# Patient Record
Sex: FEMALE
[De-identification: names, ages, dates, MRNs, and addresses within clinical notes are randomized per-mention and may not be internally consistent; named-entity substitution may affect disease eponyms.]

---

## 2018-07-20 NOTE — RADIOLOGY IMAGING REPORT
[place] [facility] [place] 

 

PATIENT NAME: Kerri Burch

: 1950

MR: [id number]

V: [id number]

EXAM DATE: 

ORDERING PHYSICIAN: DEANNE CERNA

TECHNOLOGIST: 

 

Location: [facility]

Patient: Kerri Burch

: 1950

MRN: [id number]

Visit/Account:[id number]

Date of Sevice:  2018

 

ACCESSION #: [id number]

 

DEXA Scan

 

Clinical history:  Osteopenia.

 

Comparison: None available.

 

LUMBAR SPINE:

The bone mineral density (BMD) measured from L1-L4 correlates with a Z-score 0.8 and a T-score of -1.
1 which is osteopenia as defined by the World Health Organization.  The corresponding risk of fractur
e in the lumbar spine is 2-3 times compared with a young adult reference population.

 

HIP:

Bone mineral density (BMD) measured in the Left femoral neck region correlates with a Z-score -0.1 an
d a T-score of -1.8 which is osteopenia as defined by the World Health Organization.  The correspondi
ng risk of fracture in the hip is 3-4 times compared with a young adult reference population.

 

Bone mineral density (BMD) measured in the Femoral Neck region measures 0.782 g/cm2.

 

Impression:

1.  Lumbar spine:  Osteopenia.

2.  Left Hip:  Osteopenia.

3.  Femoral Neck:  Bone Mineral Density is 0.782 g/cm2

 

The next DEXA scan of this patient should include the following sites: L1-L4 and the left hip.

 

FRAX? WHO Fracture Risk Assessment Tool link:

<http://www.shef.ac.uk/FRAX/tool.jsp?locationValue=9>

 

PLEASE NOTE:

1)  The World Health Organization defines low BMD as follows:

                                                                       T-score

                   Normal                                  > -1

                   Osteopenia                           < -1 and  > -2.5

                   Osteoporosis                         < -2.5 without fractures

                   Established osteoporosis       < -2.5 with fractures

2)  In general, you may wish to consider:

                    Diagnosis                  Treatment                       Follow-up DEXA

 

                    Normal BMD            Prevention                      2-3 years

                    Osteopenia                Prevention/therapy         1-2 years

                    Osteoporosis             Therapy                           Yearly

3)  Fracture risk estimated from the T-score is more accurate for vertebral fractures (often spontane
ous) than for hip fractures.

 

 

Report Dictated By: Laron Roger MD at 2018 12:19 PM

 

Report E-Signed By: Laron Roger MD  at 2018 12:20 PM

 

WSN:[id number]

## 2018-10-17 ENCOUNTER — HOSPITAL ENCOUNTER (EMERGENCY)
Dept: HOSPITAL 89 - ER | Age: 68
Discharge: HOME | End: 2018-10-17
Payer: MEDICARE

## 2018-10-17 VITALS — DIASTOLIC BLOOD PRESSURE: 83 MMHG | SYSTOLIC BLOOD PRESSURE: 116 MMHG
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
## 2018-10-17 DIAGNOSIS — S06.0X0A: Primary | ICD-10-CM

## 2018-10-17 PROCEDURE — 70486 CT MAXILLOFACIAL W/O DYE: CPT

## 2018-10-17 PROCEDURE — 70450 CT HEAD/BRAIN W/O DYE: CPT

## 2018-10-17 PROCEDURE — 99284 EMERGENCY DEPT VISIT MOD MDM: CPT

## 2018-10-17 PROCEDURE — 72125 CT NECK SPINE W/O DYE: CPT
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
## 2018-10-17 NOTE — RADIOLOGY IMAGING REPORT
FACILITY: [facility] 

 

PATIENT NAME: Kerri Burch

: 1950

MR: [id number]

V: [id number]

EXAM DATE: [id number]

ORDERING PHYSICIAN: ANIA BATES

TECHNOLOGIST: 

 

Location: [facility]

Patient: Kerri Burch

: 1950

MRN: [id number]

Visit/Account:[id number]

Date of Sevice: 10/17/2018

 

ACCESSION #: [id number]

 

Head CT scan without contrast

 

COMPARISONS: None

 

ADDITIONAL PERTINENT HISTORY: Injury, hitting head

 

TECHNIQUE:  Multiple axial images were obtained from the skull base to the vertex without IV contrast
. One of the following dose optimization techniques was utilized in the performance of this exam: Aut
omated exposure control; adjustment of the mA and/or kV according to the patient's size; or use of an
 iterative  reconstruction technique.  Specific details can be referenced in the facility's radiology
 CT exam operational policy.

 

FINDINGS:

 

Midline shift: Negative

 

Ventricles:  Negative

 

Brain parenchyma:  Negative

 

Extra-axial spaces:  Mild cerebral atrophy.

 

Intracranial vasculature:  Mild cavernous internal carotid artery calcifications. Otherwise negative

 

Osseous structures:  Negative

 

Paranasal sinuses and mastoid air cells:  Negative

 

Surrounding soft tissues and orbits:  Negative

 

 

IMPRESSION:

1. Mild age related changes as described above.

2. No evidence of acute intracranial pathology.

 

Report Dictated By: Abner Farias MD at 10/17/2018 3:58 PM

 

Report E-Signed By: Abner Farias MD  at 10/17/2018 4:00 PM

 

WSN:[id number]

## 2018-10-17 NOTE — RADIOLOGY IMAGING REPORT
FACILITY: [facility] [place] 

 

PATIENT NAME: Kerri Burch

: 1950

MR: [id number]

V: [id number]

EXAM DATE: [id number]

ORDERING PHYSICIAN: ANIA BATES

TECHNOLOGIST: 

 

Location: [facility] [place]

Patient: Kerri Burch

: 1950

MRN: [id number]

Visit/Account:[id number]

Date of Sevice: 10/17/2018

 

ACCESSION #: [id number]

 

C-SPINE W/O CONTRAST

 

COMPARISONS: None.

 

ADDITIONAL PERTINENT HISTORY: Injury with pain.

 

TECHNIQUE:  Multiple axial images were obtained from the skull base through the upper thoracic spine 
with coronal and sagittal reformatted images obtained without IV contrast. One of the following dose 
optimization techniques was utilized in the performance of this exam: Automated exposure control; adj
ustment of the mA and/or kV according to the patient's size; or use of an iterative  reconstruction t
echnique.  Specific details can be referenced in the facility's radiology CT exam operational policy.


 

FINDINGS.

 

Vertebral body heights and alignment: Straightening of normal cervical lordosis.

 

Vertebral bodies: Mild anteriorly and posteriorly directed osteophytes in the lower cervical spine. N
o bony fractures.

 

Disc spaces: Mild disc space narrowing involving the lower cervical spine.

 

Cranial cervical junction: Negative.

 

Cervical thoracic junction: Negative.

 

Surrounding soft tissues: Partially calcified cystic lesion involving the lower pole of the right lob
e of the thyroid measuring 1 cm for which a thyroid sonogram may be a potential benefit for further e
valuation.

 

Lung apices: Negative.

 

IMPRESSION:

1. Mild spondylitic change involving the cervical spine.

2. No acute appearing bony abnormalities.

 

 

Report Dictated By: Abner Farias MD at 10/17/2018 4:09 PM

 

Report E-Signed By: Abner Farias MD  at 10/17/2018 4:11 PM

 

WSN:[id number]
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
## 2018-10-24 ENCOUNTER — HOSPITAL ENCOUNTER (OUTPATIENT)
Dept: HOSPITAL 89 - LAB | Age: 68
End: 2018-10-24
Attending: INTERNAL MEDICINE
Payer: MEDICARE

## 2018-10-24 DIAGNOSIS — M85.80: Primary | ICD-10-CM

## 2018-10-24 PROCEDURE — 83970 ASSAY OF PARATHORMONE: CPT

## 2018-10-24 PROCEDURE — 36415 COLL VENOUS BLD VENIPUNCTURE: CPT

## 2018-10-24 PROCEDURE — 82310 ASSAY OF CALCIUM: CPT

## 2018-10-26 ENCOUNTER — HOSPITAL ENCOUNTER (OUTPATIENT)
Dept: HOSPITAL 89 - US | Age: 68
End: 2018-10-26
Attending: INTERNAL MEDICINE
Payer: MEDICARE

## 2018-10-26 DIAGNOSIS — Z02.9: Primary | ICD-10-CM

## 2018-11-08 ENCOUNTER — HOSPITAL ENCOUNTER (OUTPATIENT)
Dept: HOSPITAL 89 - US | Age: 68
LOS: 1 days | Discharge: HOME | End: 2018-11-09
Attending: INTERNAL MEDICINE
Payer: MEDICARE

## 2018-11-08 DIAGNOSIS — E04.1: ICD-10-CM

## 2018-11-08 DIAGNOSIS — Z01.818: Primary | ICD-10-CM

## 2018-11-08 LAB — INR PPP: 0.95

## 2018-11-08 PROCEDURE — 10022: CPT

## 2018-11-08 PROCEDURE — 88172 CYTP DX EVAL FNA 1ST EA SITE: CPT

## 2018-11-08 PROCEDURE — 76942 ECHO GUIDE FOR BIOPSY: CPT

## 2018-11-08 PROCEDURE — 88104 CYTOPATH FL NONGYN SMEARS: CPT

## 2018-11-08 PROCEDURE — 85610 PROTHROMBIN TIME: CPT

## 2018-11-08 PROCEDURE — 36415 COLL VENOUS BLD VENIPUNCTURE: CPT

## 2018-11-08 PROCEDURE — 76536 US EXAM OF HEAD AND NECK: CPT

## 2018-11-08 PROCEDURE — 85730 THROMBOPLASTIN TIME PARTIAL: CPT
